# Patient Record
Sex: MALE
[De-identification: names, ages, dates, MRNs, and addresses within clinical notes are randomized per-mention and may not be internally consistent; named-entity substitution may affect disease eponyms.]

---

## 2018-02-06 ENCOUNTER — HOSPITAL ENCOUNTER (EMERGENCY)
Dept: HOSPITAL 45 - C.EDB | Age: 23
LOS: 1 days | Discharge: HOME | End: 2018-02-07
Payer: COMMERCIAL

## 2018-02-06 VITALS
HEIGHT: 69.02 IN | HEIGHT: 69.02 IN | BODY MASS INDEX: 22.99 KG/M2 | BODY MASS INDEX: 22.99 KG/M2 | WEIGHT: 155.21 LBS | WEIGHT: 155.21 LBS

## 2018-02-06 DIAGNOSIS — J45.909: ICD-10-CM

## 2018-02-06 DIAGNOSIS — F17.200: ICD-10-CM

## 2018-02-06 DIAGNOSIS — J11.1: Primary | ICD-10-CM

## 2018-02-06 DIAGNOSIS — Z82.49: ICD-10-CM

## 2018-02-06 LAB
BASOPHILS # BLD: 0.03 K/UL (ref 0–0.2)
BASOPHILS NFR BLD: 0.3 %
EOS ABS #: 0.08 K/UL (ref 0–0.5)
EOSINOPHIL NFR BLD AUTO: 193 K/UL (ref 130–400)
HCT VFR BLD CALC: 45.7 % (ref 42–52)
HGB BLD-MCNC: 16 G/DL (ref 14–18)
IG#: 0.03 K/UL (ref 0–0.02)
IMM GRANULOCYTES NFR BLD AUTO: 8.8 %
LYMPHOCYTES # BLD: 0.91 K/UL (ref 1.2–3.4)
MCH RBC QN AUTO: 29.7 PG (ref 25–34)
MCHC RBC AUTO-ENTMCNC: 35 G/DL (ref 32–36)
MCV RBC AUTO: 84.8 FL (ref 80–100)
MONO ABS #: 0.83 K/UL (ref 0.11–0.59)
MONOCYTES NFR BLD: 8 %
NEUT ABS #: 8.49 K/UL (ref 1.4–6.5)
NEUTROPHILS # BLD AUTO: 0.8 %
NEUTROPHILS NFR BLD AUTO: 81.8 %
PMV BLD AUTO: 9.9 FL (ref 7.4–10.4)
RED CELL DISTRIBUTION WIDTH CV: 12.4 % (ref 11.5–14.5)
RED CELL DISTRIBUTION WIDTH SD: 38.4 FL (ref 36.4–46.3)
WBC # BLD AUTO: 10.37 K/UL (ref 4.8–10.8)

## 2018-02-06 NOTE — EMERGENCY ROOM VISIT NOTE
History


Report prepared by Lupe:  Macrina Rudolph


Under the Supervision of:  RADHA BrownO.


First contact with patient:  22:56


Chief Complaint:  FLU LIKE SX


Stated Complaint:  HIGH FEVER, FLU, CONGESTION, COUGH, RUNNY NOSE





History of Present Illness


The patient is a 22 year old male who presents to the Emergency Room with 

complaints of persistent flu like symptoms that started yesterday morning. The 

patient states he felt weak all day yesterday. He notes he began to develop a 

cough, congestion, and fever throughout the day. He reports he took Ibuprofen 

for his fever which seemed to help temporarily but his fever would spike again. 

He states his fever got as low as 99 degrees Fahrenheit but nothing less. He 

notes he took Mucinex for his cough and congestion but it did not seem to help. 

He states the symptoms started getting worse last night. The patient notes he 

has been sticking to a liquid diet since yesterday. He notes it is normal for 

him not to eat too many solid foods while sick. He denies any vomiting or 

diarrhea. He notes he has burning with urination. He denies any unusual rashes 

or sores.





   Source of History:  patient


   Onset:  yesterday morning


   Position:  other (global)


   Timing:  other (persistent)


   Associated Symptoms:  + fevers, + cough, + urinary symptoms (burning while 

urinating), No vomiting, No diarrhea





Review of Systems


See HPI for pertinent positives & negatives. A total of 10 systems reviewed and 

were otherwise negative.





Past Medical & Surgical


Asthma, dust allergy.





Family History





Hypertension





Social History


Smoking Status:  Current Every Day Smoker


Marital Status:  single


Housing Status:  lives with roommate


Occupation Status:  [employer] student





Current/Historical Medications


Scheduled


Benzonatate (Tessalon Perles), 100 MG PO Q8


Oseltamivir (Tamiflu), 75 MG PO BID





Allergies


Coded Allergies:  


     No Known Allergies (Unverified , 2/7/18)





Physical Exam


Vital Signs











  Date Time  Temp Pulse Resp B/P (MAP) Pulse Ox O2 Delivery O2 Flow Rate FiO2


 


2/7/18 01:02 36.9 91 20 119/80 95   


 


2/7/18 00:32  91 20 119/80 95 Room Air  


 


2/6/18 22:39 36.7 105 18 140/82 92 Room Air  











Physical Exam


GENERAL: alert, ill appearing, well nourished, no distress, non-toxic, 

diaphoretic


EYE EXAM: normal conjunctiva, PERRL and EOM's grossly intact


OROPHARYNX: no exudate, no erythema, lips, buccal mucosa, and tongue normal and 

mucous membranes are moist


NECK: supple, no nuchal rigidity, no adenopathy, non-tender


LUNGS: Clear to auscultation. Normal chest wall mechanics


HEART: no murmurs, S1 normal and S2 normal 


ABDOMEN: abdomen soft, non-tender, normo-active bowel sounds, no masses, no 

rebound or guarding. 


BACK: Back is symmetrical on inspection and there is no deformity, no midline 

tenderness, no CVA tenderness. 


SKIN: no rashes and no bruising 


UPPER EXTREMITIES: upper extremities are grossly normal. 


LOWER EXTREMITIES: No pitting edema.





Medical Decision & Procedures


ER Provider


Diagnostic Interpretation:


XRAY: A single view study was reviewed, no fracture was seen.


No cardiomegaly, no effusion, no wide mediastinum, no focal infiltrate.





Laboratory Results


2/6/18 23:25








Red Blood Count 5.39, Mean Corpuscular Volume 84.8, Mean Corpuscular Hemoglobin 

29.7, Mean Corpuscular Hemoglobin Concent 35.0, Mean Platelet Volume 9.9, 

Neutrophils (%) (Auto) 81.8, Lymphocytes (%) (Auto) 8.8, Monocytes (%) (Auto) 

8.0, Eosinophils (%) (Auto) 0.8, Basophils (%) (Auto) 0.3, Neutrophils # (Auto) 

8.49, Lymphocytes # (Auto) 0.91, Monocytes # (Auto) 0.83, Eosinophils # (Auto) 

0.08, Basophils # (Auto) 0.03





2/6/18 23:25

















Test


  2/6/18


23:25


 


White Blood Count


  10.37 K/uL


(4.8-10.8)


 


Red Blood Count


  5.39 M/uL


(4.7-6.1)


 


Hemoglobin


  16.0 g/dL


(14.0-18.0)


 


Hematocrit 45.7 % (42-52) 


 


Mean Corpuscular Volume


  84.8 fL


()


 


Mean Corpuscular Hemoglobin


  29.7 pg


(25-34)


 


Mean Corpuscular Hemoglobin


Concent 35.0 g/dl


(32-36)


 


Platelet Count


  193 K/uL


(130-400)


 


Mean Platelet Volume


  9.9 fL


(7.4-10.4)


 


Neutrophils (%) (Auto) 81.8 % 


 


Lymphocytes (%) (Auto) 8.8 % 


 


Monocytes (%) (Auto) 8.0 % 


 


Eosinophils (%) (Auto) 0.8 % 


 


Basophils (%) (Auto) 0.3 % 


 


Neutrophils # (Auto)


  8.49 K/uL


(1.4-6.5)


 


Lymphocytes # (Auto)


  0.91 K/uL


(1.2-3.4)


 


Monocytes # (Auto)


  0.83 K/uL


(0.11-0.59)


 


Eosinophils # (Auto)


  0.08 K/uL


(0-0.5)


 


Basophils # (Auto)


  0.03 K/uL


(0-0.2)


 


RDW Standard Deviation


  38.4 fL


(36.4-46.3)


 


RDW Coefficient of Variation


  12.4 %


(11.5-14.5)


 


Immature Granulocyte % (Auto) 0.3 % 


 


Immature Granulocyte # (Auto)


  0.03 K/uL


(0.00-0.02)


 


Anion Gap


  6.0 mmol/L


(3-11)


 


Est Creatinine Clear Calc


Drug Dose 111.9 ml/min 


 


 


Estimated GFR (


American) 118.4 


 


 


Estimated GFR (Non-


American 102.2 


 


 


BUN/Creatinine Ratio 11.4 (10-20) 


 


Calcium Level


  8.5 mg/dl


(8.5-10.1)


 


Total Bilirubin


  0.5 mg/dl


(0.2-1)


 


Aspartate Amino Transf


(AST/SGOT) 50 U/L (15-37) 


 


 


Alanine Aminotransferase


(ALT/SGPT) 41 U/L (12-78) 


 


 


Alkaline Phosphatase


  83 U/L


()


 


Total Protein


  7.5 gm/dl


(6.4-8.2)


 


Albumin


  4.2 gm/dl


(3.4-5.0)


 


Globulin


  3.3 gm/dl


(2.5-4.0)


 


Albumin/Globulin Ratio 1.3 (0.9-2) 


 


Influenza Type A Antigen


  POS for Influ


A (NEG)


 


Influenza Type B Antigen


  Neg for Influ


B (NEG)





Laboratory results per my review.





Medications Administered











 Medications


  (Trade)  Dose


 Ordered  Sig/Fely


 Route  Start Time


 Stop Time Status Last Admin


Dose Admin


 


 Benzonatate


  (Tessalon Perles


 Cap)  100 mg  NOW  ONCE


 PO  2/6/18 23:15


 2/6/18 23:16 DC 2/6/18 23:24


100 MG


 


 Acetaminophen


  (Tylenol Tab)  1,000 mg  NOW  STAT


 PO  2/6/18 23:09


 2/6/18 23:10 DC 2/6/18 23:24


1,000 MG


 


 Oseltamivir


 Phosphate


  (Tamiflu Cap)  75 mg  NOW  STAT


 PO  2/7/18 00:40


 2/7/18 00:41 DC 2/7/18 00:48


75 MG











ECG


Indication:  other (flu like symptoms)


Rate (beats per minute):  96


Rhythm:  sinus rhythm


Findings:  T-wave inversion (in V2), no acute ischemic change, other (normal 

axis, normal intervals, no conclusive finding for pericarditis)


Change:


EKG: Patient's electrocardiogram per my interpretation.





ED Course


2256: The patient was evaluated in room A2. A complete history and physical 

exam was performed. 





2309: Tylenol Tab 1000 mg PO.





2315: Benzonatate 100 mg PO. 





2333: I reviewed EMR and recent ED visits. 





0040: Tamiflu Cap 75 mg PO. 





0045: I updated the patient with his results. I answered all of the patient's 

questions at bedside. He is ready for discharge.





Medical Decision


Differential diagnosis:


Etiologies such as viral syndrome, otitis, pharyngitis, pneumonia, influenza, 

meningitis, urinary tract infection, sepsis, bacteremia, as well as others were 

entertained.





Pt here with flu like sx for 48 hours.  Flu + on testing.  No evidence of 

pneumonia, no evidence of UTI despite complaints.  Doubt bacteremia/sepsis, hx 

and exam not consistent with meningitis/encephalitis and did not feel required 

LP.  No sx to strongly suggest pericarditis/myocarditis.  Pt improved with meds 

and IVF here.  Discussed f/u, OTC meds, hydration, sx to watch/return for, he 

verbalized understanding and was agreeable with plan.





Medication Reconcilliation


Current Medication List:  was personally reviewed by me





Blood Pressure Screening


Patient's blood pressure:  Normal blood pressure





Impression





 Primary Impression:  


 Flu





Scribe Attestation


The scribe's documentation has been prepared under my direction and personally 

reviewed by me in its entirety. I confirm that the note above accurately 

reflects all work, treatment, procedures, and medical decision making performed 

by me.





Departure Information


Dispostion


Home / Self-Care





Prescriptions





Benzonatate (Tessalon Perles) 100 Mg Cap


100 MG PO Q8 for Cough, #30 CAP


   Prov: Kitty Whitfield, DO         2/7/18 


Oseltamivir (Tamiflu) 75 Mg Cap


75 MG PO BID, #10 CAP


   Prov: Kitty Whitfield., DO         2/7/18





Referrals


No Doctor, Assigned (PCP)





Patient Instructions


My [facility]





Additional Instructions





Please continue drinking clear liquids at frequent intervals to stay well 

hydrated.  You may eat as tolerated but it may take several days for your 

appetite to return to normal.  You may use tylenol and ibuprofen/motrin/advil 

to help control fevers and body aches.  Do not take advil/motrin/ibuprofen on 

an empty stomach.  Take the tamiflu as prescribed.  If you have any worsening 

pain, your fevers don't respond to medications, you have trouble breathing or a 

worsening cough, develop blood in your sputum, develop diarrhea/vomiting, have 

worsening dizziness or are passing out, feel confused or have worsening headache

, or you have any other new or concerning symptoms, please return to the 

emergency room.

## 2018-02-07 VITALS
SYSTOLIC BLOOD PRESSURE: 119 MMHG | DIASTOLIC BLOOD PRESSURE: 80 MMHG | HEART RATE: 91 BPM | OXYGEN SATURATION: 95 % | TEMPERATURE: 98.42 F

## 2018-02-07 LAB
ALBUMIN SERPL-MCNC: 4.2 GM/DL (ref 3.4–5)
ALP SERPL-CCNC: 83 U/L (ref 45–117)
ALT SERPL-CCNC: 41 U/L (ref 12–78)
AST SERPL-CCNC: 50 U/L (ref 15–37)
BUN SERPL-MCNC: 12 MG/DL (ref 7–18)
CALCIUM SERPL-MCNC: 8.5 MG/DL (ref 8.5–10.1)
CO2 SERPL-SCNC: 24 MMOL/L (ref 21–32)
CREAT SERPL-MCNC: 1.04 MG/DL (ref 0.6–1.4)
GLUCOSE SERPL-MCNC: 82 MG/DL (ref 70–99)
INFLUENZA B ANTIGEN: (no result)
POTASSIUM SERPL-SCNC: 3.8 MMOL/L (ref 3.5–5.1)
PROT SERPL-MCNC: 7.5 GM/DL (ref 6.4–8.2)
SODIUM SERPL-SCNC: 134 MMOL/L (ref 136–145)

## 2018-02-07 NOTE — DIAGNOSTIC IMAGING REPORT
CHEST ONE VIEW PORTABLE



CLINICAL HISTORY: cough, fever    



COMPARISON STUDY:  No previous studies for comparison.



FINDINGS: The cardiac and mediastinal contours are normal. There is no evidence

of focal pulmonary consolidation. There is no evidence of failure. No pleural

effusions are visualized.[ A linear opacity at the right lung base, likely

represents visualization of a prominent vessel, or artifact..



IMPRESSION: No active disease in the chest.







Electronically signed by:  Chavo Steward M.D.

2/7/2018 6:39 AM



Dictated Date/Time:  2/7/2018 6:38 AM

## 2018-03-13 ENCOUNTER — HOSPITAL ENCOUNTER (EMERGENCY)
Dept: HOSPITAL 45 - C.EDB | Age: 23
Discharge: HOME | End: 2018-03-13
Payer: COMMERCIAL

## 2018-03-13 VITALS
HEIGHT: 69.02 IN | WEIGHT: 156.75 LBS | HEIGHT: 69.02 IN | WEIGHT: 156.75 LBS | BODY MASS INDEX: 23.22 KG/M2 | BODY MASS INDEX: 23.22 KG/M2

## 2018-03-13 VITALS
HEART RATE: 73 BPM | DIASTOLIC BLOOD PRESSURE: 84 MMHG | OXYGEN SATURATION: 99 % | TEMPERATURE: 98.06 F | SYSTOLIC BLOOD PRESSURE: 130 MMHG

## 2018-03-13 DIAGNOSIS — F17.200: ICD-10-CM

## 2018-03-13 DIAGNOSIS — J01.10: Primary | ICD-10-CM

## 2018-03-13 NOTE — EMERGENCY ROOM VISIT NOTE
ED Visit Note


First contact with patient:  15:50


CHIEF COMPLAINT: Left frontal headache and congestion, nosebleed





HISTORY OF PRESENT ILLNESS: This 22-year-old male patient presents to the 

emergency department ambulatory, complaining of URI symptoms x 1 week.  The 

patient states they have been experiencing left frontal headache, congestion, 

runny nose, sore throat, cough, chills, and left otalgia.  The patient was 

recently on spring break in [place], and states the headache significantly 

worsened on the plane.  He believes symptoms improved while in [place], and 

seemed to worsen when he returned, but is uncertain because he drank in the 

significant amount of alcohol while on the trip.  They deny any other symptoms 

including swollen lymph nodes, fever, nausea, or vomiting.  There is pain with 

swallowing due to the sore throat.  The patient describes the drainage from the 

nose as purulent and bloody, only from the left nare. There is significant 

sinus pressure and left frontal pain.  The patient has taken ibuprofen 

intermittently.  The patient does not recall any known exposure to strep 

throat. The patient does have a history of sinus infections which have 

presented similarly in the past.  Denies a rash.





REVIEW OF SYSTEMS: A 10 system review of systems was performed with positives 

and pertinent negatives listed in the history of present illness. All other 

systems were reviewed and are negative. 





ALLERGIES: None





MEDICATIONS: None





PMH: None





SOCIAL HISTORY: The patient lives locally with his roommate.  He is a [employer] student.  He denies drug use.  He does admit to smoking 1 pack of 

cigarettes every 4 days.  He does admit to alcohol use socially.





PHYSICAL EXAM: 


VITALS: Vitals are noted on the nurse's note and reviewed by myself.  Vital 

signs stable.


GENERAL: This is a 22-year-old male, in no acute distress, nondiaphoretic, well-

developed well-nourished.  


SKIN: The skin was without rashes, erythema, edema, or bruising.  There is no 

tenting of the skin.  Capillary reflex less than 2 seconds.


HEAD: Normocephalic atraumatic.  


EARS: External auditory canals clear, right tympanic membrane pearly gray 

without erythema, bulging, or effusion.  Left TM slightly erythematous with 

mild effusion.  No bulging.


EYES: Pupils equal round and reactive to light and accommodation.  Conjunctivae 

without injection, sclerae without icterus.  Extraocular movements intact.  


NOSE: Patent, turbinates with inflammation and erythema on the left.  Right 

turbinates without inflammation or erythema. Purulent rhinorrhea noted from the 

left nare.  Left frontal sinus tenderness.


MOUTH: Mucous membranes moist.  Tonsils are not enlarged.  Pharynx without 

erythema or exudate.  Purulent postnasal drip noted.  Uvula midline.  Airway 

patent.  Tongue does not deviate.  


NECK: Supple without nuchal rigidity.  No lymphadenopathy.  No thyromegaly.  

Cervical spine is nontender.  No JVD.


HEART: Regular rate and rhythm without murmurs gallops or rubs.


LUNGS: Clear to auscultation bilaterally without wheezes, rales or rhonchi.  No 

dullness to percussion.  No retractions or accessory muscle use.


MUSCULOSKELETAL: No muscle atrophy, erythema, or edema noted.  Full range of 

motion without joint tenderness in all extremities.  No tenderness to 

palpation.  Normal gait.  Strength 5/5 throughout.


NEURO: Patient was alert and oriented to person place and time.  Normal 

sensation to light and sharp touch.  No focal neurological deficits.





EMERGENCY DEPARTMENT COURSE/MDM: The patient was seen and evaluated as above.  

His symptoms are consistent with a left frontal sinusitis.  He will be treated 

with antibiotics at this time with close follow-up by [facility].  The patient was encouraged to return immediately to the emergency 

department for any worsening symptoms.  He is to follow-up at [facility] in 48-72 

hours for reevaluation.  I did discuss options with the patient including CT 

scan to rule out ICH or mass, but the patient declines at this time.  He is 

encouraged to return for any worsening symptoms for imaging to be performed at 

that time.  Discharge instructions reviewed, and the patient was discharged 

home in good condition.





I attest that I have personally reviewed the patient's current medication list. 


Patient was found to have normal blood pressure on screening and does not 

require follow-up. 





DIFFERENTIAL DIAGNOSIS: Acute Sinusitis, Acute pharyngitis, URI, Strep 

Pharyngitis, viral etiology, otitis media, ICH, eustachian tube dysfunction, SAH

, tension headache, migraine, malignancy, and others





DIAGNOSIS: Left frontal sinusitis


Current/Historical Medications


Scheduled


Amoxicillin (Amoxil), 1,000 MG PO TID


Benzonatate (Tessalon Perles), 100 MG PO Q8


Oseltamivir (Tamiflu), 75 MG PO BID





Allergies


Coded Allergies:  


     No Known Allergies (Unverified , 2/7/18)





Vital Signs











  Date Time  Temp Pulse Resp B/P (MAP) Pulse Ox O2 Delivery O2 Flow Rate FiO2


 


3/13/18 15:47 36.7 73 20 130/84 99 Room Air  











Departure Information


Impression





 Primary Impression:  


 Acute sinus infection





Dispostion


Home / Self-Care





Condition


GOOD





Prescriptions





Amoxicillin (AMOXIL) 500 Mg Cap


1000 MG PO TID for 10 Days, #60 CAP


   Prov: Arin Williamson PA-C         3/13/18





Referrals


No Doctor, Assigned (PCP)





Patient Instructions


ED Headache Sinus, ED Sinusitis Abx Tx, My [facility]





Additional Instructions





You were seen and evaluated in the emergency department today for a headache 

which is consistent with an acute left frontal sinus infection.





You were prescribed amoxicillin to be taken 3 times daily. This is an 

antibiotic. All antibiotics have the potential to cause diarrhea. Stop this 

medication and contact a medical provider if you were to develop any 

significant adverse side effects including: wheezing, shortness of breath, 

passing out, vomiting, or a diffuse rash. Always take antibiotics as directed 

and COMPLETE the ENTIRE course regardless of the improvement of your symptoms.





For your sore throat, you may use a 1:1 mixture of liquid Benadryl and liquid 

Maalox. Gargle and spit this mixture. It will help to soothe the throat and 

provide some relief.





Drink warm tea with honey and lemon, as this will also help to soothe the 

throat.





Gargle with salt water frequently.





As discussed, you should take OTC Mucinex and/or Sudafed for your symptoms. 

Please do not exceed the recommended daily dosages.





Ibuprofen(Motrin, Advil) may be used for fever or pain. Use 600mg every six 

hours as needed. Take with food. Avoid using more than 2400mg in a 24 hour 

period. Do not use 2400mg per day for more than three consecutive days without 

physician direction. Prolonged inappropriate use can lead to stomach upset or 

ulcers.  This medication will help with the swelling in your sinuses.


(AND/OR)


Acetaminophen(Tylenol) may be used for fever or pain. Use 1000mg every six 

hours as needed. Avoid using more than 3000mg in a 24 hour period. 





For congestion, you may use Flonase OTC.





You may want to consider zinc, echinacea, and vitamin C to help boost your 

immunity.





Please get plenty of rest and drink plenty of fluids.





Please return or follow-up with your PCP in 1 week if you are not experiencing 

any improvement in your symptoms.





Return to the emergency department for coughing up blood, difficulty breathing, 

chest pain, worsening symptoms, or for other concerns.





Problem Qualifiers








 Primary Impression:  


 Acute sinus infection


 Sinusitis location:  frontal  Recurrence:  non-recurrent  Qualified Codes:  

J01.10 - Acute frontal sinusitis, unspecified